# Patient Record
Sex: MALE | Race: WHITE | Employment: OTHER | URBAN - METROPOLITAN AREA
[De-identification: names, ages, dates, MRNs, and addresses within clinical notes are randomized per-mention and may not be internally consistent; named-entity substitution may affect disease eponyms.]

---

## 2020-12-28 ENCOUNTER — HOSPITAL ENCOUNTER (EMERGENCY)
Age: 60
Discharge: ANOTHER HOSPITAL | End: 2020-12-28
Attending: EMERGENCY MEDICINE
Payer: COMMERCIAL

## 2020-12-28 ENCOUNTER — APPOINTMENT (OUTPATIENT)
Dept: GENERAL RADIOLOGY | Age: 60
End: 2020-12-28
Payer: COMMERCIAL

## 2020-12-28 VITALS
OXYGEN SATURATION: 95 % | TEMPERATURE: 97.4 F | RESPIRATION RATE: 16 BRPM | WEIGHT: 250 LBS | HEIGHT: 72 IN | SYSTOLIC BLOOD PRESSURE: 103 MMHG | BODY MASS INDEX: 33.86 KG/M2 | HEART RATE: 67 BPM | DIASTOLIC BLOOD PRESSURE: 65 MMHG

## 2020-12-28 PROCEDURE — 29505 APPLICATION LONG LEG SPLINT: CPT | Performed by: EMERGENCY MEDICINE

## 2020-12-28 PROCEDURE — 73610 X-RAY EXAM OF ANKLE: CPT

## 2020-12-28 PROCEDURE — 73590 X-RAY EXAM OF LOWER LEG: CPT

## 2020-12-28 PROCEDURE — 99284 EMERGENCY DEPT VISIT MOD MDM: CPT | Performed by: EMERGENCY MEDICINE

## 2020-12-28 PROCEDURE — 6370000000 HC RX 637 (ALT 250 FOR IP): Performed by: EMERGENCY MEDICINE

## 2020-12-28 RX ORDER — HYDROCODONE BITARTRATE AND ACETAMINOPHEN 5; 325 MG/1; MG/1
2 TABLET ORAL ONCE
Status: COMPLETED | OUTPATIENT
Start: 2020-12-28 | End: 2020-12-28

## 2020-12-28 RX ADMIN — HYDROCODONE BITARTRATE AND ACETAMINOPHEN 2 TABLET: 5; 325 TABLET ORAL at 07:50

## 2020-12-28 ASSESSMENT — PAIN DESCRIPTION - LOCATION: LOCATION: ANKLE

## 2020-12-28 ASSESSMENT — PAIN DESCRIPTION - DESCRIPTORS: DESCRIPTORS: ACHING

## 2020-12-28 ASSESSMENT — PAIN DESCRIPTION - ORIENTATION: ORIENTATION: RIGHT

## 2020-12-28 ASSESSMENT — PAIN SCALES - GENERAL
PAINLEVEL_OUTOF10: 10
PAINLEVEL_OUTOF10: 10

## 2020-12-28 ASSESSMENT — PAIN DESCRIPTION - PAIN TYPE: TYPE: ACUTE PAIN

## 2020-12-28 NOTE — ED PROVIDER NOTES
Emergency Department Encounter  Location: [address] [place] [address]    Patient: Ingrid Luu  MRN: [id number]  : 1960  Date of evaluation: 2020  ED Provider: Rianna Bravo DO, FACEP    Chief Complaint:    Ankle Pain (right ankle ) and Fall (tripped over area rug )    [place]:  Ingrid Luu is a 62 y.o. male that presents to the emergency department by squad with complaints of injury to his right ankle after falling. The patient states he tripped over an area rug. He is complaining of pain on his medial and lateral ankle as well as pain in his proximal fibula. Patient denies other injury at this time. He states that he was walking to the bathroom and tripped over the rug. He denies hitting his head. He denies chest pain shortness of breath or other associated signs and symptoms. ROS:  At least 4 systems reviewed and otherwise acutely negative except as in the [address] Negative for fever or chills  Negative for chest pain  Negative for shortness of breath  Negative for nausea vomiting diarrhea or constipation    Past Medical History:   Diagnosis Date    Seizures [facility])      Past Surgical History:   Procedure Laterality Date    APPENDECTOMY       History reviewed. No pertinent family history.   Social History     Socioeconomic History    Marital status:      Spouse name: Not on file    Number of children: Not on file    Years of education: Not on file    Highest education level: Not on file   Occupational History    Not on file   Social Needs    Financial resource strain: Not on file    Food insecurity     Worry: Not on file     Inability: Not on file    Transportation needs     Medical: Not on file     Non-medical: Not on file   Tobacco Use    Smoking status: Former Smoker   Substance and Sexual Activity    Alcohol use: Not Currently    Drug use: Not Currently    Sexual activity: Not on file   Lifestyle    Physical activity     Days per week: Not on file 12/28/20. Radiographs (if obtained):  [] The following radiograph was interpreted by myself in the absence of a radiologist:  [x] Radiologist's Report reviewed at time of ED visit:  XR TIBIA FIBULA RIGHT (2 VIEWS)   Final Result   Acute obliquely oriented fracture through the distal fibular shaft without   displacement. In addition, there is cortical discontinuity with step-off   involving the proximal fibular metaphysis posteriorly suspicious for   nondisplaced fracture. Additionally, nondisplaced fracture of the medial   malleolus without displacement. Questionable irregularity involving the posterior malleolus may also   represent nondisplaced fracture         XR ANKLE RIGHT (MIN 3 VIEWS)   Final Result   Acute obliquely oriented fracture through the distal fibular shaft without   displacement. In addition, there is cortical discontinuity with step-off   involving the proximal fibular metaphysis posteriorly suspicious for   nondisplaced fracture. Additionally, nondisplaced fracture of the medial   malleolus without displacement. Questionable irregularity involving the posterior malleolus may also   represent nondisplaced fracture             ED Course and MDM:  Patient presents to the emergency department with complaints of injury to his right ankle after tripping over a throw rug. The patient has fracture of his medial malleolus with a fracture of his distal fibula and proximal fibula. He has been placed in a long-leg splint. This was placed by the nurse and checked by me. He is neurovascularly intact on post splint placement. The patient is being transferred to [facility] for definitive care and treatment. He is unable to ambulate secondary to this injury. I discussed his case with Dr. Danie Deleon the hospitalist who has agreed accept this patient in transfer. Dr. Tyra Jonas the orthopedic doctor on-call is also aware of this patient but I did not speak with him directly.   Patient will

## 2020-12-28 NOTE — ED NOTES
Report called to Sarah Dow at [facility] out      [facility]  12/28/20 1201

## 2020-12-28 NOTE — ED NOTES
Contacted the [facility] to begin the transfer process.                Bev Garber RN  12/28/20 7555

## 2020-12-28 NOTE — ED NOTES
Attempted to ambulate the patient with a walker. The patient could not \"ambulate due to the pain. \"     Prosper Black RN  12/28/20 0367

## 2023-03-06 ENCOUNTER — OFFICE (OUTPATIENT)
Dept: URBAN - METROPOLITAN AREA CLINIC 18 | Facility: CLINIC | Age: 63
End: 2023-03-06

## 2023-03-06 VITALS
HEIGHT: 63 IN | DIASTOLIC BLOOD PRESSURE: 76 MMHG | HEART RATE: 103 BPM | WEIGHT: 249 LBS | SYSTOLIC BLOOD PRESSURE: 138 MMHG

## 2023-03-06 DIAGNOSIS — K92.1 MELENA: ICD-10-CM

## 2023-03-06 PROCEDURE — 99203 OFFICE O/P NEW LOW 30 MIN: CPT | Performed by: INTERNAL MEDICINE

## 2023-04-18 ENCOUNTER — OFFICE (OUTPATIENT)
Dept: URBAN - METROPOLITAN AREA PATHOLOGY 1 | Facility: PATHOLOGY | Age: 63
End: 2023-04-18
Payer: MEDICARE

## 2023-04-18 ENCOUNTER — AMBULATORY SURGICAL CENTER (OUTPATIENT)
Dept: URBAN - METROPOLITAN AREA SURGERY 7 | Facility: SURGERY | Age: 63
End: 2023-04-18

## 2023-04-18 ENCOUNTER — AMBULATORY SURGICAL CENTER (OUTPATIENT)
Dept: URBAN - METROPOLITAN AREA SURGERY 7 | Facility: SURGERY | Age: 63
End: 2023-04-18
Payer: MEDICARE

## 2023-04-18 VITALS
HEART RATE: 86 BPM | SYSTOLIC BLOOD PRESSURE: 124 MMHG | HEART RATE: 81 BPM | DIASTOLIC BLOOD PRESSURE: 104 MMHG | RESPIRATION RATE: 16 BRPM | RESPIRATION RATE: 16 BRPM | HEIGHT: 63 IN | DIASTOLIC BLOOD PRESSURE: 104 MMHG | OXYGEN SATURATION: 97 % | SYSTOLIC BLOOD PRESSURE: 122 MMHG | SYSTOLIC BLOOD PRESSURE: 123 MMHG | SYSTOLIC BLOOD PRESSURE: 109 MMHG | HEART RATE: 89 BPM | OXYGEN SATURATION: 95 % | HEART RATE: 85 BPM | OXYGEN SATURATION: 98 % | SYSTOLIC BLOOD PRESSURE: 122 MMHG | RESPIRATION RATE: 12 BRPM | DIASTOLIC BLOOD PRESSURE: 82 MMHG | HEART RATE: 81 BPM | HEART RATE: 89 BPM | HEART RATE: 74 BPM | RESPIRATION RATE: 12 BRPM | OXYGEN SATURATION: 96 % | SYSTOLIC BLOOD PRESSURE: 143 MMHG | DIASTOLIC BLOOD PRESSURE: 82 MMHG | HEART RATE: 77 BPM | SYSTOLIC BLOOD PRESSURE: 127 MMHG | DIASTOLIC BLOOD PRESSURE: 71 MMHG | RESPIRATION RATE: 11 BRPM | DIASTOLIC BLOOD PRESSURE: 75 MMHG | RESPIRATION RATE: 11 BRPM | OXYGEN SATURATION: 96 % | WEIGHT: 175 LBS | HEART RATE: 80 BPM | HEART RATE: 76 BPM | DIASTOLIC BLOOD PRESSURE: 72 MMHG | SYSTOLIC BLOOD PRESSURE: 135 MMHG | DIASTOLIC BLOOD PRESSURE: 71 MMHG | OXYGEN SATURATION: 99 % | DIASTOLIC BLOOD PRESSURE: 75 MMHG | DIASTOLIC BLOOD PRESSURE: 77 MMHG | HEART RATE: 74 BPM | RESPIRATION RATE: 13 BRPM | WEIGHT: 175 LBS | HEART RATE: 77 BPM | SYSTOLIC BLOOD PRESSURE: 145 MMHG | SYSTOLIC BLOOD PRESSURE: 109 MMHG | SYSTOLIC BLOOD PRESSURE: 124 MMHG | HEART RATE: 85 BPM | SYSTOLIC BLOOD PRESSURE: 143 MMHG | RESPIRATION RATE: 14 BRPM | SYSTOLIC BLOOD PRESSURE: 123 MMHG | OXYGEN SATURATION: 95 % | HEART RATE: 75 BPM | OXYGEN SATURATION: 98 % | HEART RATE: 86 BPM | RESPIRATION RATE: 13 BRPM | OXYGEN SATURATION: 97 % | SYSTOLIC BLOOD PRESSURE: 130 MMHG | HEIGHT: 63 IN | TEMPERATURE: 97.8 F | HEART RATE: 76 BPM | SYSTOLIC BLOOD PRESSURE: 135 MMHG | HEART RATE: 75 BPM | SYSTOLIC BLOOD PRESSURE: 127 MMHG | OXYGEN SATURATION: 99 % | SYSTOLIC BLOOD PRESSURE: 145 MMHG | RESPIRATION RATE: 14 BRPM | DIASTOLIC BLOOD PRESSURE: 72 MMHG | TEMPERATURE: 97.8 F | SYSTOLIC BLOOD PRESSURE: 130 MMHG | HEART RATE: 80 BPM | DIASTOLIC BLOOD PRESSURE: 77 MMHG

## 2023-04-18 DIAGNOSIS — K64.8 OTHER HEMORRHOIDS: ICD-10-CM

## 2023-04-18 DIAGNOSIS — K92.1 MELENA: ICD-10-CM

## 2023-04-18 DIAGNOSIS — D12.5 BENIGN NEOPLASM OF SIGMOID COLON: ICD-10-CM

## 2023-04-18 PROBLEM — K63.5 POLYP OF COLON: Status: ACTIVE | Noted: 2023-04-18

## 2023-04-18 PROCEDURE — 45385 COLONOSCOPY W/LESION REMOVAL: CPT | Performed by: INTERNAL MEDICINE

## 2023-04-18 PROCEDURE — 88305 TISSUE EXAM BY PATHOLOGIST: CPT | Performed by: PATHOLOGY

## 2023-04-21 LAB
PDF: PDF REPORT: (no result)
PDF: PDF REPORT: (no result)

## 2024-04-16 LAB
A/G RATIO: 1.1 (ref 1–2)
ALBUMIN: 4 G/DL (ref 3.5–5.7)
ALP BLD-CCNC: 143 U/L (ref 34–104)
ALT SERPL-CCNC: 13 U/L (ref 7–52)
ANION GAP SERPL CALCULATED.3IONS-SCNC: 9 MMOL/L (ref 7–16)
AST SERPL-CCNC: 17 U/L (ref 13–39)
BILIRUB SERPL-MCNC: 0.4 MG/DL (ref 0.3–1)
BUN BLDV-MCNC: 21 MG/DL (ref 7–25)
CALCIUM SERPL-MCNC: 9.6 MG/DL (ref 8.6–10.2)
CHLORIDE BLD-SCNC: 103 MMOL/L (ref 98–107)
CO2: 26 MMOL/L (ref 21–31)
CREAT SERPL-MCNC: 1.15 MG/DL (ref 0.7–1.3)
EGFR MALE: 72 ML/MIN/1.73M2
FOLATE: 16.38 NG/ML (ref 5.9–24.8)
GLOBULIN: 3.7 G/DL (ref 2.6–4.2)
GLUCOSE BLD-MCNC: 92 MG/DL (ref 74–109)
HBA1C MFR BLD: 5.4 % (ref 4–6)
MEAN PLASMA GLUCOSE: 108 MG/DL (ref 68–126)
POTASSIUM SERPL-SCNC: 4.6 MMOL/L (ref 3.5–5.1)
SODIUM BLD-SCNC: 138 MMOL/L (ref 136–145)
TOTAL CK: 71 U/L (ref 30–223)
TOTAL PROTEIN: 7.7 G/DL (ref 6–8.3)
TSH SERPL DL<=0.05 MIU/L-ACNC: 1.72 UIU/ML (ref 0.45–5.33)
VITAMIN B-12: 317 PG/ML (ref 180–914)
VITAMIN D 25-HYDROXY: 69.8 NG/ML

## 2024-04-18 LAB
ALBUMIN URINE ELP: 28.8 %
GAMMA GLOBULIN, URINE: ABNORMAL
Lab: ABNORMAL
PROTEIN ELECTROPHORESIS, URINE: 14 MG/DL